# Patient Record
(demographics unavailable — no encounter records)

---

## 2024-12-09 NOTE — HISTORY OF PRESENT ILLNESS
[Father] : father [Fruit] : fruit [Vegetables] : vegetables [Meat] : meat [Eggs] : eggs [Dairy] : dairy [Normal] : Normal [Brushing teeth twice/d] : brushing teeth twice per day [Playtime (60 min/d)] : playtime 60 min a day [Participates in after-school activities] : participates in after-school activities [< 2 hrs of screen time per day] : less than 2 hrs of screen time per day [Appropiate parent-child-sibling interaction] : appropriate parent-child-sibling interaction [Has Friends] : has friends [Grade ___] : Grade [unfilled] [Adequate social interactions] : adequate social interactions [Adequate behavior] : adequate behavior [Adequate performance] : adequate performance [Adequate attention] : adequate attention [No difficulties with Homework] : no difficulties with homework [No] : No cigarette smoke exposure [Up to date] : Up to date [FreeTextEntry9] : wrestling, baseball [FreeTextEntry7] : 8 yr old well visit

## 2024-12-09 NOTE — PHYSICAL EXAM
[Alert] : alert [No Acute Distress] : no acute distress [Normocephalic] : normocephalic [Conjunctivae with no discharge] : conjunctivae with no discharge [PERRL] : PERRL [EOMI Bilateral] : EOMI bilateral [Auricles Well Formed] : auricles well formed [Clear Tympanic membranes with present light reflex and bony landmarks] : clear tympanic membranes with present light reflex and bony landmarks [No Discharge] : no discharge [Nares Patent] : nares patent [Pink Nasal Mucosa] : pink nasal mucosa [Palate Intact] : palate intact [Nonerythematous Oropharynx] : nonerythematous oropharynx [Supple, full passive range of motion] : supple, full passive range of motion [No Palpable Masses] : no palpable masses [Symmetric Chest Rise] : symmetric chest rise [Clear to Auscultation Bilaterally] : clear to auscultation bilaterally [Regular Rate and Rhythm] : regular rate and rhythm [Normal S1, S2 present] : normal S1, S2 present [No Murmurs] : no murmurs [+2 Femoral Pulses] : +2 femoral pulses [Soft] : soft [NonTender] : non tender [Non Distended] : non distended [Normoactive Bowel Sounds] : normoactive bowel sounds [No Hepatomegaly] : no hepatomegaly [No Splenomegaly] : no splenomegaly [Ha: _____] : Ha [unfilled] [Testicles Descended Bilaterally] : testicles descended bilaterally [Patent] : patent [No fissures] : no fissures [No Abnormal Lymph Nodes Palpated] : no abnormal lymph nodes palpated [No Gait Asymmetry] : no gait asymmetry [No pain or deformities with palpation of bone, muscles, joints] : no pain or deformities with palpation of bone, muscles, joints [Normal Muscle Tone] : normal muscle tone [Straight] : straight [+2 Patella DTR] : +2 patella DTR [Cranial Nerves Grossly Intact] : cranial nerves grossly intact [No Rash or Lesions] : no rash or lesions

## 2024-12-09 NOTE — DISCUSSION/SUMMARY
[Normal Growth] : growth [Normal Development] : development [None] : No known medical problems [No Elimination Concerns] : elimination [No Feeding Concerns] : feeding [No Skin Concerns] : skin [Normal Sleep Pattern] : sleep [School] : school [Development and Mental Health] : development and mental health [Nutrition and Physical Activity] : nutrition and physical activity [Oral Health] : oral health [Safety] : safety [No Medications] : ~He/She~ is not on any medications [Patient] : patient [Full Activity without restrictions including Physical Education & Athletics] : Full Activity without restrictions including Physical Education & Athletics [] : The components of the vaccine(s) to be administered today are listed in the plan of care. The disease(s) for which the vaccine(s) are intended to prevent and the risks have been discussed with the caretaker.  The risks are also included in the appropriate vaccination information statements which have been provided to the patient's caregiver.  The caregiver has given consent to vaccinate. [FreeTextEntry1] : Continue balanced diet with all food groups. Brush teeth twice a day with toothbrush. Recommend visit to dentist. Help child to maintain consistent daily routines and sleep schedule. School discussed. Ensure home is safe. Teach child about personal safety. Use consistent, positive discipline. Limit screen time to no more than 2 hours per day. Encourage physical activity. Child needs to ride in a belt-positioning booster seat until  4 feet 9 inches has been reached and are between 8 and 12 years of age.

## 2025-01-11 NOTE — HISTORY OF PRESENT ILLNESS
[de-identified] : Persistent cough, no fevers or any other symptoms [FreeTextEntry6] : Coughing for the last 2 weeks, no change, no fevers now but had fever at beginning of symptoms no other symptoms brother had walking pna at [facility]   No sore throat, no respiratory distress, no wheezing or dyspnea. No rashes, no lethargy, no myalgia. No abdominal pain, no vomiting or diarrhea, stooling normally. Voiding normally, eating and drinking well. No concern for dehydration.   No sick contacts. No recent travel. No other concerns at this time

## 2025-01-11 NOTE — PLAN
[TextEntry] : Take antibiotics as prescribed Monitor for respiratory distress such as retractions, wheezing, color changes, rapid and irregular breathing patterns, stridor, or change of mental status -- if any of these symptoms/signs occur go directly to ER.  If child has fever/discomfort you can give Ibuprofen (>6months old) or Acetaminophen as per weight. Continue to encourage fluids to prevent dehydration. Encourage rest and decrease physical exertions. Can utilize nasal suctioning if needed.   Return to office in 4-5 days to check lungs or sooner with new or worsening symptoms.

## 2025-01-11 NOTE — REVIEW OF SYSTEMS
[Headache] : no headache [Ear Pain] : no ear pain [Tachypnea] : not tachypneic [Nasal Congestion] : nasal congestion [Wheezing] : no wheezing [Cough] : cough [Congestion] : congestion [Shortness of Breath] : no shortness of breath [Negative] : Genitourinary

## 2025-01-11 NOTE — PHYSICAL EXAM
[Wheezing] : no wheezing [Transmitted Upper Airway Sounds] : no transmitted upper airway sounds [Tachypnea] : no tachypnea [Rhonchi] : no rhonchi [NL] : warm, clear [FreeTextEntry7] : bilateral diffuse crackles upper lobes < lower lobes